# Patient Record
(demographics unavailable — no encounter records)

---

## 2025-01-30 NOTE — PLAN
[FreeTextEntry1] : regarding vaginal atrophy and a long discussion. I discussed considering topical at increased. I discussed risks and benefits. I discussed the small amount of systemic absorption and its associated risks including elevated risk of DVT, breast cancer etc. Patient verbalized understanding and I gave her a prescription for vagifem.\par  \par  Regarding osteopenia and also had a long discussion. I discussed the silent nature of the disease and the morbidity associated with it including hip and spinal fractures. We will await resutls of dexa.

## 2025-01-30 NOTE — PHYSICAL EXAM
[Awake] : awake [Alert] : alert [Acute Distress] : no acute distress [Mass] : no breast mass [Nipple Discharge] : no nipple discharge [Axillary LAD] : no axillary lymphadenopathy [Soft] : soft [Tender] : non tender [Oriented x3] : oriented to person, place, and time [Vulvar Atrophy] : vulvar atrophy [Normal] : uterus [No Bleeding] : there was no active vaginal bleeding [Uterine Adnexae] : were not tender and not enlarged [RRR, No Murmurs] : RRR, no murmurs [CTAB] : CTAB [Chaperone Present] : A chaperone was present in the examining room during all aspects of the physical examination [59722] : A chaperone was present during the pelvic exam. [FreeTextEntry2] : sav dubose

## 2025-02-10 NOTE — DISCUSSION/SUMMARY
[FreeTextEntry1] : I again discussed her options including hormonal and nonhormonal options with hormonal options being the gold standard.  I discussed benefits in terms of vasomotor symptoms, vulvovaginal atrophy as well as prevention of osteoporosis.  I also discussed possible off label benefits in terms of reduction of cognitive decline, depression anxiety etc.  I discussed risks of DVT etc. with her.  Patient is not interested in pursuing nonhormonal options.  In terms of hormonal options I discussed different modes of delivery including oral versus transdermal and I discussed possible reduction in risk with transdermal.  Patient wishes to proceed with oral medication and we discussed starting 0.5 mg of estradiol and 100 mg of progesterone.  She will monitor her symptoms and will advise me if she has any vaginal bleeding.  Patient is due for mammogram shortly.  All her questions were answered.

## 2025-02-10 NOTE — REASON FOR VISIT
[Home] : at home, [unfilled] , at the time of the visit. [Medical Office: ([facility])___] : at the medical office located in  [Telehealth (audio & video)] : This visit was provided via telehealth using real-time 2-way audio visual technology. [Verbal consent obtained from patient] : the patient, [unfilled]

## 2025-02-10 NOTE — HISTORY OF PRESENT ILLNESS
[FreeTextEntry1] : This is a telehealth conversation with this 55-year-old para 2 for her complaint of hot flashes.  After verbal consent was obtained the conversation was had.  Patient has history of vaginal dryness and dyspareunia and take some Vagifem but has been also having hot flashes and night sweats.  We have discussed medical management and patient wished to research these meds.  She also had blood work that revealed elevated FSH and low estradiol and normal testosterone level.  Her TSH was normal as well.